# Patient Record
Sex: FEMALE | Race: WHITE
[De-identification: names, ages, dates, MRNs, and addresses within clinical notes are randomized per-mention and may not be internally consistent; named-entity substitution may affect disease eponyms.]

---

## 2021-09-07 ENCOUNTER — HOSPITAL ENCOUNTER (OUTPATIENT)
Dept: HOSPITAL 19 - SDCO | Age: 41
Discharge: HOME | End: 2021-09-07
Attending: INTERNAL MEDICINE
Payer: OTHER GOVERNMENT

## 2021-09-07 VITALS — SYSTOLIC BLOOD PRESSURE: 135 MMHG | DIASTOLIC BLOOD PRESSURE: 75 MMHG | HEART RATE: 79 BPM

## 2021-09-07 VITALS — HEART RATE: 82 BPM | DIASTOLIC BLOOD PRESSURE: 85 MMHG | TEMPERATURE: 98 F | SYSTOLIC BLOOD PRESSURE: 138 MMHG

## 2021-09-07 VITALS — DIASTOLIC BLOOD PRESSURE: 80 MMHG | SYSTOLIC BLOOD PRESSURE: 128 MMHG | HEART RATE: 77 BPM

## 2021-09-07 VITALS — WEIGHT: 235.45 LBS | HEIGHT: 70 IN | BODY MASS INDEX: 33.71 KG/M2

## 2021-09-07 VITALS — SYSTOLIC BLOOD PRESSURE: 126 MMHG | HEART RATE: 76 BPM | TEMPERATURE: 97.8 F | DIASTOLIC BLOOD PRESSURE: 76 MMHG

## 2021-09-07 VITALS — SYSTOLIC BLOOD PRESSURE: 128 MMHG | DIASTOLIC BLOOD PRESSURE: 81 MMHG | HEART RATE: 71 BPM

## 2021-09-07 DIAGNOSIS — G89.29: ICD-10-CM

## 2021-09-07 DIAGNOSIS — K29.31: Primary | ICD-10-CM

## 2021-09-07 DIAGNOSIS — Z20.822: ICD-10-CM

## 2021-09-07 DIAGNOSIS — Z85.41: ICD-10-CM

## 2021-09-07 DIAGNOSIS — Z79.891: ICD-10-CM

## 2021-09-07 DIAGNOSIS — M54.9: ICD-10-CM

## 2021-09-07 DIAGNOSIS — F17.210: ICD-10-CM

## 2021-09-07 DIAGNOSIS — K64.0: ICD-10-CM

## 2021-09-07 DIAGNOSIS — Z98.84: ICD-10-CM

## 2021-09-07 DIAGNOSIS — I10: ICD-10-CM

## 2021-09-07 DIAGNOSIS — G62.9: ICD-10-CM

## 2021-09-07 NOTE — NUR
0935  Pt returns from endo procedure via cart and RN assist to [facility].  Pt
desires to use the restroom before going back to room.  Cart brought to
restroom.  Pt assisted in with this RN assist.  Pt's  remains in
restroom with her while she voids.  Pt ambulates from restroom to [facility]
with  and RN assist and walking with cane.  Monitors on and alarms set.
Call light within reach.  Pt alert and oriented.  Pt requests muffin and
apple juice.  Pt denies any pain or nausea.
 
0945  Pt taking food and drink well.  No complications noted.
 
1023  Discharge instructions given to pt and .  All questions answered
to their satisfaction.  Handed to pt are a thank you card and discharge
information.
 
1030  Pt transferred out of the hospital via wheelchair and this RN assist, to
private vehicle driven by .

## 2021-10-08 ENCOUNTER — HOSPITAL ENCOUNTER (OUTPATIENT)
Dept: HOSPITAL 19 - COL.RAD | Age: 41
End: 2021-10-08
Payer: OTHER GOVERNMENT

## 2021-10-08 DIAGNOSIS — M50.31: ICD-10-CM

## 2021-10-08 DIAGNOSIS — M51.26: Primary | ICD-10-CM

## 2022-01-24 ENCOUNTER — HOSPITAL ENCOUNTER (OUTPATIENT)
Dept: HOSPITAL 19 - MHCPAIN | Age: 42
End: 2022-01-24
Attending: ANESTHESIOLOGY
Payer: OTHER GOVERNMENT

## 2022-01-24 DIAGNOSIS — M47.817: Primary | ICD-10-CM

## 2022-01-24 DIAGNOSIS — M53.3: ICD-10-CM

## 2022-01-24 DIAGNOSIS — M54.16: ICD-10-CM

## 2022-01-24 PROCEDURE — G0463 HOSPITAL OUTPT CLINIC VISIT: HCPCS

## 2022-02-10 ENCOUNTER — HOSPITAL ENCOUNTER (OUTPATIENT)
Dept: HOSPITAL 19 - MHCPAIN | Age: 42
End: 2022-02-10
Attending: ANESTHESIOLOGY
Payer: OTHER GOVERNMENT

## 2022-02-10 DIAGNOSIS — M54.16: ICD-10-CM

## 2022-02-10 DIAGNOSIS — M47.816: Primary | ICD-10-CM

## 2022-02-10 DIAGNOSIS — M53.3: ICD-10-CM

## 2022-03-15 ENCOUNTER — HOSPITAL ENCOUNTER (OUTPATIENT)
Dept: HOSPITAL 19 - MHCPAIN | Age: 42
End: 2022-03-15
Attending: ANESTHESIOLOGY
Payer: OTHER GOVERNMENT

## 2022-03-15 DIAGNOSIS — F17.210: ICD-10-CM

## 2022-03-15 DIAGNOSIS — M47.817: Primary | ICD-10-CM

## 2022-03-15 DIAGNOSIS — M53.3: ICD-10-CM

## 2022-03-15 DIAGNOSIS — M54.50: ICD-10-CM

## 2022-03-15 PROCEDURE — G0463 HOSPITAL OUTPT CLINIC VISIT: HCPCS

## 2022-04-11 ENCOUNTER — HOSPITAL ENCOUNTER (OUTPATIENT)
Dept: HOSPITAL 19 - MHCPAIN | Age: 42
End: 2022-04-11
Attending: ANESTHESIOLOGY
Payer: OTHER GOVERNMENT

## 2022-04-11 DIAGNOSIS — M54.50: ICD-10-CM

## 2022-04-11 DIAGNOSIS — M53.3: ICD-10-CM

## 2022-04-11 DIAGNOSIS — M47.817: Primary | ICD-10-CM

## 2022-04-27 ENCOUNTER — HOSPITAL ENCOUNTER (OUTPATIENT)
Dept: HOSPITAL 19 - MHCPAIN | Age: 42
End: 2022-04-27
Attending: ANESTHESIOLOGY
Payer: OTHER GOVERNMENT

## 2022-04-27 DIAGNOSIS — M54.50: ICD-10-CM

## 2022-04-27 DIAGNOSIS — M47.817: Primary | ICD-10-CM

## 2022-04-27 DIAGNOSIS — M53.3: ICD-10-CM

## 2022-04-27 PROCEDURE — G0463 HOSPITAL OUTPT CLINIC VISIT: HCPCS

## 2022-05-12 ENCOUNTER — HOSPITAL ENCOUNTER (OUTPATIENT)
Dept: HOSPITAL 19 - MHCPAIN | Age: 42
End: 2022-05-12
Attending: ANESTHESIOLOGY
Payer: OTHER GOVERNMENT

## 2022-05-12 DIAGNOSIS — M47.817: Primary | ICD-10-CM

## 2022-05-12 DIAGNOSIS — M53.3: ICD-10-CM

## 2022-05-12 DIAGNOSIS — M54.50: ICD-10-CM

## 2022-06-23 ENCOUNTER — HOSPITAL ENCOUNTER (OUTPATIENT)
Dept: HOSPITAL 19 - MHCPAIN | Age: 42
End: 2022-06-23
Attending: ANESTHESIOLOGY
Payer: OTHER GOVERNMENT

## 2022-06-23 DIAGNOSIS — M54.50: ICD-10-CM

## 2022-06-23 DIAGNOSIS — M53.3: ICD-10-CM

## 2022-06-23 DIAGNOSIS — M47.817: Primary | ICD-10-CM

## 2022-06-23 PROCEDURE — G0463 HOSPITAL OUTPT CLINIC VISIT: HCPCS

## 2022-09-13 ENCOUNTER — HOSPITAL ENCOUNTER (OUTPATIENT)
Dept: HOSPITAL 19 - MHCPAIN | Age: 42
End: 2022-09-13
Attending: ANESTHESIOLOGY
Payer: OTHER GOVERNMENT

## 2022-09-13 DIAGNOSIS — M54.16: ICD-10-CM

## 2022-09-13 DIAGNOSIS — M54.59: ICD-10-CM

## 2022-09-13 DIAGNOSIS — M54.2: Primary | ICD-10-CM

## 2022-09-13 DIAGNOSIS — M47.897: ICD-10-CM

## 2022-09-13 PROCEDURE — G0463 HOSPITAL OUTPT CLINIC VISIT: HCPCS
